# Patient Record
Sex: FEMALE | Race: BLACK OR AFRICAN AMERICAN | NOT HISPANIC OR LATINO | Employment: FULL TIME | URBAN - METROPOLITAN AREA
[De-identification: names, ages, dates, MRNs, and addresses within clinical notes are randomized per-mention and may not be internally consistent; named-entity substitution may affect disease eponyms.]

---

## 2017-01-03 ENCOUNTER — HOSPITAL ENCOUNTER (EMERGENCY)
Facility: HOSPITAL | Age: 32
Discharge: HOME | End: 2017-01-03
Attending: EMERGENCY MEDICINE
Payer: MEDICAID

## 2017-01-03 VITALS
DIASTOLIC BLOOD PRESSURE: 74 MMHG | HEIGHT: 65 IN | BODY MASS INDEX: 23.49 KG/M2 | OXYGEN SATURATION: 96 % | RESPIRATION RATE: 18 BRPM | SYSTOLIC BLOOD PRESSURE: 118 MMHG | HEART RATE: 79 BPM | WEIGHT: 141 LBS | TEMPERATURE: 98 F

## 2017-01-03 DIAGNOSIS — Z20.2 SEXUALLY TRANSMITTED DISEASE EXPOSURE: ICD-10-CM

## 2017-01-03 DIAGNOSIS — N93.9 ABNORMAL VAGINAL BLEEDING: Primary | ICD-10-CM

## 2017-01-03 LAB
B-HCG UR QL: NEGATIVE
BACTERIA #/AREA URNS HPF: ABNORMAL /HPF
BACTERIA GENITAL QL WET PREP: ABNORMAL
BILIRUB UR QL STRIP: NEGATIVE
CLARITY UR: CLEAR
CLUE CELLS VAG QL WET PREP: ABNORMAL
COLOR UR: YELLOW
CTP QC/QA: YES
FILAMENT FUNGI VAG WET PREP-#/AREA: ABNORMAL
GLUCOSE UR QL STRIP: NEGATIVE
HGB UR QL STRIP: ABNORMAL
KETONES UR QL STRIP: NEGATIVE
LEUKOCYTE ESTERASE UR QL STRIP: NEGATIVE
MICROSCOPIC COMMENT: ABNORMAL
NITRITE UR QL STRIP: NEGATIVE
PH UR STRIP: 6 [PH] (ref 5–8)
PROT UR QL STRIP: NEGATIVE
RBC #/AREA URNS HPF: 8 /HPF (ref 0–4)
SP GR UR STRIP: 1.02 (ref 1–1.03)
SPECIMEN SOURCE: ABNORMAL
SQUAMOUS #/AREA URNS HPF: 7 /HPF
T VAGINALIS GENITAL QL WET PREP: ABNORMAL
URN SPEC COLLECT METH UR: ABNORMAL
UROBILINOGEN UR STRIP-ACNC: NEGATIVE EU/DL
WBC #/AREA URNS HPF: 1 /HPF (ref 0–5)
WBC #/AREA VAG WET PREP: ABNORMAL
YEAST GENITAL QL WET PREP: ABNORMAL

## 2017-01-03 PROCEDURE — 87210 SMEAR WET MOUNT SALINE/INK: CPT

## 2017-01-03 PROCEDURE — 87591 N.GONORRHOEAE DNA AMP PROB: CPT

## 2017-01-03 PROCEDURE — 81025 URINE PREGNANCY TEST: CPT | Performed by: PHYSICIAN ASSISTANT

## 2017-01-03 PROCEDURE — 81000 URINALYSIS NONAUTO W/SCOPE: CPT

## 2017-01-03 PROCEDURE — 99284 EMERGENCY DEPT VISIT MOD MDM: CPT | Mod: 25

## 2017-01-03 NOTE — ED PROVIDER NOTES
"Encounter Date: 1/3/2017       History     Chief Complaint   Patient presents with    Exposure to STD     Pt. c/o itching and redness. Pt. stated, "I have a mirena and I also want to get it checked. I have been having spotting. My friend sent my a picture of this geovanni's penis and now I am worried".      Review of patient's allergies indicates:  No Known Allergies  HPI Comments: 30yo f with no significant pmh presents to ED with chief complaint suspected exposure to STI. Pt states she had unprotected sex on dillon and has been told that her partner has some sort of rash. She denies abdominal pain, denies urinary symptoms, denies flank pain, denies fever/chills, denies genital rash. Denies n/v/d. Denies hematuria. She does admit to spotting, which she states she hasn't done in over 2 years with Mirena device. She does not have PCP or OBGYN.     The history is provided by the patient.     History reviewed. No pertinent past medical history.  No past medical history pertinent negatives.  History reviewed. No pertinent past surgical history.  History reviewed. No pertinent family history.  Social History   Substance Use Topics    Smoking status: Never Smoker    Smokeless tobacco: None    Alcohol use No     Review of Systems   Constitutional: Negative.  Negative for chills and fever.   HENT: Negative.    Eyes: Negative.    Respiratory: Negative.  Negative for cough and shortness of breath.    Cardiovascular: Negative.  Negative for chest pain and leg swelling.   Gastrointestinal: Negative.  Negative for abdominal pain, anal bleeding, blood in stool, constipation, diarrhea, nausea and vomiting.   Endocrine: Negative.    Genitourinary: Positive for vaginal bleeding. Negative for difficulty urinating, dyspareunia, dysuria, flank pain, genital sores, hematuria, menstrual problem, pelvic pain, vaginal discharge and vaginal pain.   Musculoskeletal: Negative.  Negative for neck pain and neck stiffness.   Skin: Negative.  " Negative for color change, pallor, rash and wound.   Allergic/Immunologic: Negative.    Neurological: Negative.    Hematological: Negative.    Psychiatric/Behavioral: Negative.    All other systems reviewed and are negative.      Physical Exam   Initial Vitals   BP Pulse Resp Temp SpO2   01/03/17 1227 01/03/17 1227 01/03/17 1227 01/03/17 1227 01/03/17 1227   133/76 92 17 98.9 °F (37.2 °C) 98 %     Physical Exam    Nursing note and vitals reviewed.  Constitutional: Vital signs are normal. She appears well-developed and well-nourished. She does not have a sickly appearance. She does not appear ill. No distress.   HENT:   Head: Normocephalic and atraumatic.   Nose: Nose normal.   Eyes: Conjunctivae and EOM are normal.   Neck: Normal range of motion. Neck supple.   Cardiovascular: Normal heart sounds.   No murmur heard.  Pulmonary/Chest: Breath sounds normal. No respiratory distress. She has no wheezes. She exhibits no tenderness.   Abdominal: Soft. Normal appearance and bowel sounds are normal. She exhibits no distension. There is no tenderness. There is no rebound, no guarding, no CVA tenderness and negative Anaya's sign. No hernia.   Genitourinary: Rectum normal. Pelvic exam was performed with patient supine. There is no rash, tenderness or lesion on the right labia. There is no rash, tenderness or lesion on the left labia. Uterus is not deviated and not tender. Cervix exhibits no motion tenderness, no discharge and no friability. Right adnexum displays no mass and no tenderness. Left adnexum displays no mass and no tenderness. No erythema, tenderness or bleeding in the vagina. No foreign body in the vagina. No signs of injury around the vagina. Vaginal discharge found.       Genitourinary Comments: Cervical os closed. No discharge. No blood from os. No cmt. No cervix friability. 2 strings from OS visualized, in expected position. Brown,milky discharge in vaginal vault, no obvious bleeding, clots, laceration, or  erythema visualized in vagina    Musculoskeletal: Normal range of motion. She exhibits no tenderness.   Neurological: She is alert and oriented to person, place, and time. She has normal strength.   Skin: Skin is warm and dry. No rash and no abscess noted. No erythema.   Psychiatric: She has a normal mood and affect. Her behavior is normal. Judgment and thought content normal.         ED Course   Procedures  Labs Reviewed - No data to display          Medical Decision Making:   Initial Assessment:   32yo f presents with chief complaint possible exposure to STI and would like to be evaluated  Differential Diagnosis:   UTI, STI  Clinical Tests:   Lab Tests: Ordered and Reviewed  The following lab test(s) were unremarkable: Urinalysis  ED Management:  Well-appearing, in NAD. Vital signs WNL. No abdominal pain. No urinary symptoms. PE unremarkable. Suspicion of UTI low. Pyelonephritis likelihood low. She has no vaginal symptoms, no urinary symptoms, and PE unremarkable. I do not see benefit in treating her with abx in ED for g/c. Cultures will return and we will treat if positive at that point. She has no signs of hypovolemia or anemia. I do think she is safe for d/c with OBGYN f/u. She is to return if she starts with lightheadedness/dizziness, fever, abdominal pain. Vaginal screen negative. Urinalysis negative. Will call if G/C positive.   Other:   I have discussed this case with another health care provider.              Attending Attestation:     Physician Attestation Statement for NP/PA:   I discussed this assessment and plan of this patient with the NP/PA, but I did not personally examine the patient. The face to face encounter was performed by the NP/PA.    Other NP/PA Attestation Additions:      Medical Decision Makin-year-old female presenting after possible STD exposure.  Exam unremarkable.  I agree with plan.                 ED Course     Clinical Impression:   The primary encounter diagnosis was Abnormal  vaginal bleeding. A diagnosis of Sexually transmitted disease exposure was also pertinent to this visit.          Morgan Sarah PA-C  01/03/17 9424       Gunnar Glasgow MD  01/04/17 3839

## 2017-01-03 NOTE — ED TRIAGE NOTES
"Patient came in PER personal transport with c/o itching and redness. Patient patient states that she wants to get her mirena. Patient states that she and her friend "messed with geovanni" and now she needs to be checked because the geovanni has a rash.   "

## 2017-01-05 LAB
C TRACH DNA SPEC QL NAA+PROBE: NEGATIVE
N GONORRHOEA DNA SPEC QL NAA+PROBE: POSITIVE

## 2017-01-26 ENCOUNTER — HOSPITAL ENCOUNTER (EMERGENCY)
Facility: HOSPITAL | Age: 32
Discharge: HOME | End: 2017-01-26
Attending: EMERGENCY MEDICINE
Payer: MEDICAID

## 2017-01-26 VITALS
SYSTOLIC BLOOD PRESSURE: 117 MMHG | TEMPERATURE: 99 F | OXYGEN SATURATION: 99 % | RESPIRATION RATE: 18 BRPM | HEIGHT: 65 IN | HEART RATE: 94 BPM | BODY MASS INDEX: 23.66 KG/M2 | DIASTOLIC BLOOD PRESSURE: 77 MMHG | WEIGHT: 142 LBS

## 2017-01-26 DIAGNOSIS — A54.9 GONORRHEA: Primary | ICD-10-CM

## 2017-01-26 LAB
B-HCG UR QL: NEGATIVE
CTP QC/QA: YES

## 2017-01-26 PROCEDURE — 99283 EMERGENCY DEPT VISIT LOW MDM: CPT | Mod: 25

## 2017-01-26 PROCEDURE — 96372 THER/PROPH/DIAG INJ SC/IM: CPT

## 2017-01-26 PROCEDURE — 63600175 PHARM REV CODE 636 W HCPCS: Performed by: PHYSICIAN ASSISTANT

## 2017-01-26 PROCEDURE — 81025 URINE PREGNANCY TEST: CPT | Performed by: EMERGENCY MEDICINE

## 2017-01-26 RX ORDER — CEFTRIAXONE 250 MG/1
250 INJECTION, POWDER, FOR SOLUTION INTRAMUSCULAR; INTRAVENOUS
Status: COMPLETED | OUTPATIENT
Start: 2017-01-26 | End: 2017-01-26

## 2017-01-26 RX ADMIN — CEFTRIAXONE SODIUM 250 MG: 250 INJECTION, POWDER, FOR SOLUTION INTRAMUSCULAR; INTRAVENOUS at 12:01

## 2017-01-26 NOTE — ED AVS SNAPSHOT
[facility]  [address] [place] [address]               Krista Robledo   2017 12:20 PM   ED    Description:  Female : 1985   Department:  [facility]           Your Care was Coordinated By:     Provider Role From To    Rahel Gann MD Attending Provider 17 1220 --    ERLINDA Sosa Physician Assistant 17 1220 --      Reason for Visit     Abnormal Lab           Diagnoses this Visit        Comments    Gonorrhea    -  Primary       ED Disposition     None           To Do List           Follow-up Information     Follow up with Sam Barlow MD.    Specialty:  Obstetrics    Why:  Follow up with your gynecologist or Dr. Barlow in 14 days for test of cure.  Call to schedule an appointment.    Contact information:    [address]  [place] [address]  [phone]        [facility]     [facility] Nurse Care [facility] -  Assistance  Registered nurses in the [facility] provide clinical advisement, health education, appointment booking, and other advisory services.  Call for this free service at [phone].             Medications           Message regarding Medications     Verify the changes and/or additions to your medication regime listed below are the same as discussed with your clinician today.  If any of these changes or additions are incorrect, please notify your healthcare provider.        These medications were administered today        Dose Freq    cefTRIAXone injection 250 mg 250 mg ED 1 Time    Sig: Inject 250 mg into the muscle ED 1 Time.    Class: Normal    Route: Intramuscular           Verify that the below list of medications is an accurate representation of the medications you are currently taking.  If none reported, the list may be blank. If incorrect, please contact your healthcare provider. Carry this list with you in case of emergency.           Current Medications      "hydrocortisone 2.5 % lotion Apply topically 2 (two) times daily.           Clinical Reference Information           Your Vitals Were     BP Pulse Temp Resp Height Weight    117/77 94 98.8 °F (37.1 °C) 18 5' 5" (1.651 m) 64.4 kg (142 lb)    Last Period SpO2 BMI          (LMP Unknown) 99% 23.63 kg/m2        Allergies as of 1/26/2017     No Known Allergies      Immunizations Administered on Date of Encounter - 1/26/2017     None      ED Micro, Lab, POCT     Start Ordered       Status Ordering Provider    01/26/17 1156 01/26/17 1155  POCT urine pregnancy  Once      Final result       ED Imaging Orders     None        Discharge Instructions       The patient is discharged to home.  You are to follow up as directed above.  No sex until your partner is treated and you both have a test of cure 14 days from day of treatment.  Return to the ED for any new or worsening symptoms: fever, abdominal/pelvic pain, unable to tolerate oral intake, weakness, dizziness, or any other concerns.        Gonorrhea (Female, Treated)    You have an infection called gonorrhea. This infection is a sexually transmitted disease (STD). It is highly contagious. It is passed by sexual contact with an infected partner. Gonorrhea can infect the internal sex organs (cervix, uterus, or fallopian tubes). Less often, it can infect the mouth, throat, and anus. In rare instances, it can infect the joints, eyes, and other areas of the body.  Women with infections in the cervix may have no symptoms or only mild symptoms early in the illness. Therefore, it is possible to pass on this infection without knowing you have it.  When symptoms do occur in a woman, they usually start 2 to 10 days after exposure. There may be a thick vaginal discharge and pain or burning when urinating. If the infection spreads to the fallopian tubes, it is called pelvic inflammatory disease (PID). This causes lower abdominal pain and fever. If not treated, gonorrhea can cause " infertility (being unable to have children) by scarring the fallopian tubes. PID also increases the risk of having a pregnancy outside the uterus (ectopic pregnancy) in the future.  Gonorrhea can be treated and cured. Treatment is with antibiotic medicine.  Home care  · Any sexual partner you have had within the last 2 months needs to be treated, even if there are no symptoms. Your partner should contact his or her healthcare provider. Or he or she can go to an urgent care clinic or the public health department to be examined and treated.  · Avoid sexual activity until both you and your partner have completed all antibiotic medicine and you have been told by your healthcare provider that you are no longer contagious.  · It is important to take all medicine as directed until it is finished. If not, the illness may recur.  · Learn about safer sex practices and use these in the future. The safest sex is with a partner who has tested negative and only has sex with you. Latex barriers such as condoms offer protection from spreading some sexually transmitted diseases, including gonorrhea, chlamydia, and HIV, but they are not a guarantee.  Follow-up care  Follow up with your healthcare provider, or as advised.  If tests were done, call as directed for results. A follow-up test should be done 4 to 6 weeks after treatment, to be sure the infection has cleared. Follow up with your healthcare provider or the public health department for complete STD screening, including HIV testing. For more information about STDs, go to www.cdc.gov/std or call CDC-Info: 411.726.6403.  When to seek medical advice  Call your healthcare provider right away if any of these occur:  · No improvement after 3 days of treatment  · New or increasing lower abdominal pain or back pain  · Unexpected vaginal bleeding  · Weakness, dizziness, or fainting  · Repeated vomiting  · Inability to urinate due to pain  · Rash or joint pain  · Painful sores on the  outer vaginal area  · Enlarged, painful lymph nodes (lumps) in the groin  · Fever of 100.4ºF (38ºC) or higher  © 8363-4211 [employer]. [address] [place] [address], [place] [address]. All rights reserved. This information is not intended as a substitute for professional medical care. Always follow your healthcare professional's instructions.          [facility] Sign-Up     Activating your [facility] account is as easy as 1-2-3!     1) Visit [email or web address], select Sign Up Now, enter this activation code and your date of birth, then select Next.  [email or web address]  Expires: 2/17/2017  2:28 PM      2) Create a username and password to use when you visit [facility] in the future and select a security question in case you lose your password and select Next.    3) Enter your e-mail address and click Sign Up!    Additional Information  If you have questions, please e-mail [email or web address] or call [phone] to talk to our [facility] staff. Remember, [facility] is NOT to be used for urgent needs. For medical emergencies, dial 911.          [facility] complies with applicable Federal civil rights laws and does not discriminate on the basis of race, color, national origin, age, disability, or sex.        Language Assistance Services     ATTENTION: Language assistance services are available, free of charge. Please call [phone].      ATENCIÓN: Si habla español, tiene a zarate disposición servicios gratuitos de asistencia lingüística. Llame al [phone].     CHÚ Ý: N?u b?n nói Ti?ng Vi?t, có các d?ch v? h? tr? ngôn ng? mi?n phí dành cho b?n. G?i s? [phone].

## 2017-01-26 NOTE — ED PROVIDER NOTES
"Encounter Date: 1/26/2017       History     Chief Complaint   Patient presents with    Abnormal Lab     pt states " I received a letter in the mail to come to the ED."     Review of patient's allergies indicates:  No Known Allergies  HPI Comments: Historian: Patient  Chief complaint: "I received a certified letter from the emergency department."  History of present illness: This 31-year-old female presents to the emergency department stating she received a certified letter from the emergency department regarding results.  Patient was seen and evaluated in this ER on 1/3/17.  She had a culture for GC/C obtained at that time.  This was positive for gonorrhea.  The patient denies fever, abdominal pain, pelvic pain, nausea, vomiting, diarrhea, vaginal discharge, vaginal lesions, dysuria, hematuria, and increased urinary frequency.    History reviewed. No pertinent past medical history.  Past Medical History Pertinent Negatives   Diagnosis Date Noted    Asthma 1/26/2017    Diabetes mellitus 1/26/2017    Hypertension 1/26/2017    Seizures 1/26/2017     History reviewed. No pertinent past surgical history.  History reviewed. No pertinent family history.  Social History   Substance Use Topics    Smoking status: Never Smoker    Smokeless tobacco: None    Alcohol use Yes      Comment: occasionally     Review of Systems   Constitutional: Negative for fever.   HENT: Negative for trouble swallowing.    Gastrointestinal: Negative for abdominal pain, diarrhea, nausea and vomiting.   Genitourinary: Negative for dysuria, frequency, hematuria, pelvic pain and vaginal discharge.   Musculoskeletal: Negative for gait problem.   Skin: Negative for rash.   Allergic/Immunologic: Negative for immunocompromised state.   Neurological: Negative for seizures.   Psychiatric/Behavioral: Negative for confusion.       Physical Exam   Initial Vitals   BP Pulse Resp Temp SpO2   01/26/17 1108 01/26/17 1108 01/26/17 1108 01/26/17 1108 01/26/17 " 1108   117/77 94 18 98.8 °F (37.1 °C) 99 %     Physical Exam    Constitutional: She appears well-developed and well-nourished. She is cooperative.  Non-toxic appearance. No distress.   HENT:   Head: Normocephalic and atraumatic.   Mouth/Throat: Mucous membranes are normal. No trismus in the jaw.   Cardiovascular: Normal rate, regular rhythm and normal heart sounds. Exam reveals no gallop.    Pulmonary/Chest: Effort normal and breath sounds normal. No tachypnea. No respiratory distress. She has no decreased breath sounds. She has no wheezes. She has no rhonchi. She has no rales.   Abdominal: Soft. Normal appearance and bowel sounds are normal. There is no tenderness. There is no rigidity, no rebound, no guarding, no CVA tenderness, no tenderness at McBurney's point and negative Anaya's sign.   Neurological: She is alert and oriented to person, place, and time. She has normal strength. No sensory deficit.   Skin: Skin is warm, dry and intact. No rash noted.         ED Course   Procedures  Labs Reviewed   POCT URINE PREGNANCY                Additional MDM:   Comments: Patient presents after receiving a certified letter from the emergency department.  She is afebrile and nontoxic appearing with a supple neck and no meningismus.  Patient was seen on 1/3/17, and she had a culture for GC/C obtained at that time.  This was positive, however she was not able to be reached by her phone number to discuss these results.  The patient denies fever, abdominal pain, pelvic pain, vomiting, and vaginal discharge.  Her abdomen is soft and nontender and without peritoneal signs on my exam.  Urine pregnancy test is negative.  Patient was given intramuscular Rocephin in the emergency department.  She understands to have a test of cure in 14 days.  She understands to abstain from sex until all partners are treated and they have tests of cure.  Careful ED warnings and return instructions given.  This patient's case was discussed with   Marcy, she is in agreement with the assessment and plan..            Attending Attestation:     Physician Attestation Statement for NP/PA:   I discussed this assessment and plan of this patient with the NP/PA, but I did not personally examine the patient. The face to face encounter was performed by the NP/PA.    Other NP/PA Attestation Additions:      Medical Decision Makin y.o. Female presents after confirmed positive GC culture, received certified letter. Given IM rocephin and will be referred to PCP for test of cure. I have discussed this patient with mid-level provider and agree with physical exam, assessment and plan.                   ED Course     Clinical Impression:   The encounter diagnosis was Gonorrhea.          ERLINDA Sosa  17 1910       Rahel Gann MD  17 1711

## 2017-01-26 NOTE — DISCHARGE INSTRUCTIONS
The patient is discharged to home.  You are to follow up as directed above.  No sex until your partner is treated and you both have a test of cure 14 days from day of treatment.  Return to the ED for any new or worsening symptoms: fever, abdominal/pelvic pain, unable to tolerate oral intake, weakness, dizziness, or any other concerns.        Gonorrhea (Female, Treated)    You have an infection called gonorrhea. This infection is a sexually transmitted disease (STD). It is highly contagious. It is passed by sexual contact with an infected partner. Gonorrhea can infect the internal sex organs (cervix, uterus, or fallopian tubes). Less often, it can infect the mouth, throat, and anus. In rare instances, it can infect the joints, eyes, and other areas of the body.  Women with infections in the cervix may have no symptoms or only mild symptoms early in the illness. Therefore, it is possible to pass on this infection without knowing you have it.  When symptoms do occur in a woman, they usually start 2 to 10 days after exposure. There may be a thick vaginal discharge and pain or burning when urinating. If the infection spreads to the fallopian tubes, it is called pelvic inflammatory disease (PID). This causes lower abdominal pain and fever. If not treated, gonorrhea can cause infertility (being unable to have children) by scarring the fallopian tubes. PID also increases the risk of having a pregnancy outside the uterus (ectopic pregnancy) in the future.  Gonorrhea can be treated and cured. Treatment is with antibiotic medicine.  Home care  · Any sexual partner you have had within the last 2 months needs to be treated, even if there are no symptoms. Your partner should contact his or her healthcare provider. Or he or she can go to an urgent care clinic or the public health department to be examined and treated.  · Avoid sexual activity until both you and your partner have completed all antibiotic medicine and you have been  told by your healthcare provider that you are no longer contagious.  · It is important to take all medicine as directed until it is finished. If not, the illness may recur.  · Learn about safer sex practices and use these in the future. The safest sex is with a partner who has tested negative and only has sex with you. Latex barriers such as condoms offer protection from spreading some sexually transmitted diseases, including gonorrhea, chlamydia, and HIV, but they are not a guarantee.  Follow-up care  Follow up with your healthcare provider, or as advised.  If tests were done, call as directed for results. A follow-up test should be done 4 to 6 weeks after treatment, to be sure the infection has cleared. Follow up with your healthcare provider or the public health department for complete STD screening, including HIV testing. For more information about STDs, go to www.cdc.gov/std or call CDC-Info: 483.826.7033.  When to seek medical advice  Call your healthcare provider right away if any of these occur:  · No improvement after 3 days of treatment  · New or increasing lower abdominal pain or back pain  · Unexpected vaginal bleeding  · Weakness, dizziness, or fainting  · Repeated vomiting  · Inability to urinate due to pain  · Rash or joint pain  · Painful sores on the outer vaginal area  · Enlarged, painful lymph nodes (lumps) in the groin  · Fever of 100.4ºF (38ºC) or higher  © 1470-4880 [employer]. [address] [place] [address]. All rights reserved. This information is not intended as a substitute for professional medical care. Always follow your healthcare professional's instructions.

## 2017-04-03 ENCOUNTER — HOSPITAL ENCOUNTER (EMERGENCY)
Facility: HOSPITAL | Age: 32
Discharge: HOME | End: 2017-04-03
Attending: EMERGENCY MEDICINE
Payer: MEDICAID

## 2017-04-03 VITALS
SYSTOLIC BLOOD PRESSURE: 114 MMHG | DIASTOLIC BLOOD PRESSURE: 62 MMHG | HEIGHT: 64 IN | WEIGHT: 153 LBS | TEMPERATURE: 99 F | RESPIRATION RATE: 16 BRPM | BODY MASS INDEX: 26.12 KG/M2 | HEART RATE: 82 BPM | OXYGEN SATURATION: 98 %

## 2017-04-03 DIAGNOSIS — J02.0 STREP THROAT: Primary | ICD-10-CM

## 2017-04-03 LAB
B-HCG UR QL: NEGATIVE
CTP QC/QA: YES

## 2017-04-03 PROCEDURE — 96372 THER/PROPH/DIAG INJ SC/IM: CPT

## 2017-04-03 PROCEDURE — 63600175 PHARM REV CODE 636 W HCPCS: Performed by: EMERGENCY MEDICINE

## 2017-04-03 PROCEDURE — 81025 URINE PREGNANCY TEST: CPT | Performed by: EMERGENCY MEDICINE

## 2017-04-03 PROCEDURE — 99283 EMERGENCY DEPT VISIT LOW MDM: CPT | Mod: 25

## 2017-04-03 RX ORDER — DEXAMETHASONE SODIUM PHOSPHATE 4 MG/ML
10 INJECTION, SOLUTION INTRA-ARTICULAR; INTRALESIONAL; INTRAMUSCULAR; INTRAVENOUS; SOFT TISSUE
Status: COMPLETED | OUTPATIENT
Start: 2017-04-03 | End: 2017-04-03

## 2017-04-03 RX ADMIN — PENICILLIN G BENZATHINE 1.2 MILLION UNITS: 1200000 INJECTION, SUSPENSION INTRAMUSCULAR at 08:04

## 2017-04-03 RX ADMIN — DEXAMETHASONE SODIUM PHOSPHATE 10 MG: 4 INJECTION, SOLUTION INTRAMUSCULAR; INTRAVENOUS at 08:04

## 2017-04-03 NOTE — ED AVS SNAPSHOT
[facility]  [place] [address]               [facility]   4/3/2017  8:00 PM   ED    Description:  Female : 1985   Department:  [facility]           Your Care was Coordinated By:     Provider Role From To    Jorge Olivo MD Attending Provider 17 --      Reason for Visit     Sore Throat           Diagnoses this Visit        Comments    Strep throat    -  Primary       ED Disposition     ED Disposition Condition Comment    Discharge  Our goal in the emergency department is to always give you outstanding care and exceptional service. You may receive a survey by mail or e-mail in the next week regarding your experience in our ED. We would greatly appreciate your completing and returnin g the survey. Your feedback provides us with a way to recognize our staff who give very good care and it helps us learn how to improve when your experience was below our aspiration of excellence.              To Do List           Follow-up Information     Follow up with [facility].    Specialty:  Emergency Medicine    Why:  If symptoms worsen    Contact information:    [place] [id number] [phone]        Please follow up.    Why:  FOLLOW UP WITH YOUR PRIMARY CARE PHYSICIAN, For repeat evaluation      [facility]     [facility] Care [facility] - 24 Assistance  Unless otherwise directed by your provider, please contact [facility], our nurse care line that is available for  assistance.     Registered nurses in the [facility] provide: appointment scheduling, clinical advisement, health education, and other advisory services.  Call: [phone] (toll free)               Medications           Message regarding Medications     Verify the changes and/or additions to your medication regime listed below are the same as discussed with your clinician today.  If any of  "these changes or additions are incorrect, please notify your healthcare provider.        These medications were administered today        Dose Freq    dexamethasone injection 10 mg 10 mg ED 1 Time    Sig: Inject 2.5 mLs (10 mg total) into the muscle ED 1 Time.    Class: Normal    Route: Intramuscular    penicillin G benzathine (BICILLIN LA) injection 1.2 Million Units 1.2 Million Units ED 1 Time    Sig: Inject 2 mLs (1.2 Million Units total) into the muscle ED 1 Time.    Class: Normal    Route: Intramuscular           Verify that the below list of medications is an accurate representation of the medications you are currently taking.  If none reported, the list may be blank. If incorrect, please contact your healthcare provider. Carry this list with you in case of emergency.           Current Medications     dexamethasone injection 10 mg Inject 2.5 mLs (10 mg total) into the muscle ED 1 Time.    hydrocortisone 2.5 % lotion Apply topically 2 (two) times daily.    penicillin G benzathine (BICILLIN LA) injection 1.2 Million Units Inject 2 mLs (1.2 Million Units total) into the muscle ED 1 Time.           Clinical Reference Information           Your Vitals Were     BP Pulse Temp Resp Height Weight    114/62 (BP Location: Left arm, Patient Position: Sitting) 82 98.7 °F (37.1 °C) (Oral) 16 5' 4" (1.626 m) 69.4 kg (153 lb)    SpO2 BMI             96% 26.26 kg/m2         Allergies as of 4/3/2017     No Known Allergies      Immunizations Administered on Date of Encounter - 4/3/2017     None      ED Micro, Lab, POCT     Start Ordered       Status Ordering Provider    04/03/17 1939 04/03/17 1939  POCT urine pregnancy  Once      Final result       ED Imaging Orders     None        Discharge Instructions         Pharyngitis: Strep (Presumed)    You have pharyngitis (sore throat). The cause is thought to be the streptococcus, or strep, bacterium. Strep throat infection can cause throat pain that is worse when swallowing, aching " all over, headache, and fever. The infection may be spread by coughing, kissing, or touching others after touching your mouth or nose. Antibiotic medications are given to treat the infection.  Home care  · Rest at home. Drink plenty of fluids to avoid dehydration.  · No work or school for the first 2 days of taking the antibiotics. After this time, you will not be contagious. You can then return to work or school if you are feeling better.   · The antibiotic medication must be taken for the full 10 days, even if you feel better. This is very important to ensure the infection is treated. It is also important to prevent drug-resistant organisms from developing. If you were given an antibiotic shot, no more antibiotics are needed.  · You may use acetaminophen or ibuprofen to control pain or fever, unless another medicine was prescribed for this. If you have chronic liver or kidney disease or ever had a stomach ulcer or GI bleeding, talk with your doctor before using these medicines.  · Throat lozenges or a throat-numbing sprays can help reduce throat pain. Gargling with warm salt water can also help. Dissolve 1/2 teaspoon of salt in 1 8 ounce glass of warm water.   · Avoid salty or spicy foods, which can irritate the throat.  Follow-up care  Follow up with your healthcare provider or our staff if you are not improving over the next week.  When to seek medical advice  Call your healthcare provider right away if any of these occur:  · Fever as directed by your doctor.   · New or worsening ear pain, sinus pain, or headache  · Painful lumps in the back of neck  · Stiff neck  · Lymph nodes are getting larger  · Inability to swallow liquids, excessive drooling, or inability to open mouth wide due to throat pain  · Signs of dehydration (very dark urine or no urine, sunken eyes, dizziness)  · Trouble breathing or noisy breathing  · Muffled voice  · New rash  Date Last Reviewed: 4/13/2015  © 1109-2820 [employer]  [address] [place] [address]. All rights reserved. This information is not intended as a substitute for professional medical care. Always follow your healthcare professional's instructions.          [facility] Sign-Up     Activating your [facility] account is as easy as 1-2-3!     1) Visit [email or web address], select Sign Up Now, enter this activation code and your date of birth, then select Next.  [email or web address]  Expires: 5/18/2017  8:08 PM      2) Create a username and password to use when you visit [facility] in the future and select a security question in case you lose your password and select Next.    3) Enter your e-mail address and click Sign Up!    Additional Information  If you have questions, please e-mail [email or web address] or call [phone] to talk to our [facility] staff. Remember, [facility] is NOT to be used for urgent needs. For medical emergencies, dial 911.          [facility] complies with applicable Federal civil rights laws and does not discriminate on the basis of race, color, national origin, age, disability, or sex.        Language Assistance Services     ATTENTION: Language assistance services are available, free of charge. Please call [phone].      ATENCIÓN: Si habla español, tiene a zarate disposición servicios gratuitos de asistencia lingüística. Llame al [phone].     CHÚ Ý: N?u b?n nói Ti?ng Vi?t, có các d?ch v? h? tr? ngôn ng? mi?n phí dành cho b?n. G?i s? [phone].

## 2017-04-04 NOTE — DISCHARGE INSTRUCTIONS
Pharyngitis: Strep (Presumed)    You have pharyngitis (sore throat). The cause is thought to be the streptococcus, or strep, bacterium. Strep throat infection can cause throat pain that is worse when swallowing, aching all over, headache, and fever. The infection may be spread by coughing, kissing, or touching others after touching your mouth or nose. Antibiotic medications are given to treat the infection.  Home care  · Rest at home. Drink plenty of fluids to avoid dehydration.  · No work or school for the first 2 days of taking the antibiotics. After this time, you will not be contagious. You can then return to work or school if you are feeling better.   · The antibiotic medication must be taken for the full 10 days, even if you feel better. This is very important to ensure the infection is treated. It is also important to prevent drug-resistant organisms from developing. If you were given an antibiotic shot, no more antibiotics are needed.  · You may use acetaminophen or ibuprofen to control pain or fever, unless another medicine was prescribed for this. If you have chronic liver or kidney disease or ever had a stomach ulcer or GI bleeding, talk with your doctor before using these medicines.  · Throat lozenges or a throat-numbing sprays can help reduce throat pain. Gargling with warm salt water can also help. Dissolve 1/2 teaspoon of salt in 1 8 ounce glass of warm water.   · Avoid salty or spicy foods, which can irritate the throat.  Follow-up care  Follow up with your healthcare provider or our staff if you are not improving over the next week.  When to seek medical advice  Call your healthcare provider right away if any of these occur:  · Fever as directed by your doctor.   · New or worsening ear pain, sinus pain, or headache  · Painful lumps in the back of neck  · Stiff neck  · Lymph nodes are getting larger  · Inability to swallow liquids, excessive drooling, or inability to open mouth wide due to throat  pain  · Signs of dehydration (very dark urine or no urine, sunken eyes, dizziness)  · Trouble breathing or noisy breathing  · Muffled voice  · New rash  Date Last Reviewed: 4/13/2015  © 9769-5516 [employer]. [address] [place] [address], [place] [address]. All rights reserved. This information is not intended as a substitute for professional medical care. Always follow your healthcare professional's instructions.

## 2017-11-22 ENCOUNTER — HOSPITAL ENCOUNTER (EMERGENCY)
Facility: HOSPITAL | Age: 32
Discharge: HOME | End: 2017-11-22
Attending: EMERGENCY MEDICINE
Payer: MEDICAID

## 2017-11-22 VITALS
WEIGHT: 145 LBS | TEMPERATURE: 98 F | HEART RATE: 94 BPM | HEIGHT: 64 IN | OXYGEN SATURATION: 98 % | RESPIRATION RATE: 20 BRPM | DIASTOLIC BLOOD PRESSURE: 58 MMHG | SYSTOLIC BLOOD PRESSURE: 111 MMHG | BODY MASS INDEX: 24.75 KG/M2

## 2017-11-22 DIAGNOSIS — S39.012A LUMBAR STRAIN, INITIAL ENCOUNTER: Primary | ICD-10-CM

## 2017-11-22 PROCEDURE — 25000003 PHARM REV CODE 250: Performed by: PHYSICIAN ASSISTANT

## 2017-11-22 PROCEDURE — 99284 EMERGENCY DEPT VISIT MOD MDM: CPT

## 2017-11-22 RX ORDER — LIDOCAINE 50 MG/G
1 PATCH TOPICAL DAILY
Qty: 8 PATCH | Refills: 0 | Status: SHIPPED | OUTPATIENT
Start: 2017-11-22

## 2017-11-22 RX ORDER — METHOCARBAMOL 500 MG/1
1000 TABLET, FILM COATED ORAL 3 TIMES DAILY
Qty: 15 TABLET | Refills: 0 | Status: SHIPPED | OUTPATIENT
Start: 2017-11-22 | End: 2017-11-27

## 2017-11-22 RX ORDER — DEXTROMETHORPHAN HYDROBROMIDE, GUAIFENESIN 5; 100 MG/5ML; MG/5ML
650 LIQUID ORAL EVERY 8 HOURS
Refills: 0 | COMMUNITY
Start: 2017-11-22

## 2017-11-22 RX ORDER — ACETAMINOPHEN 500 MG
1000 TABLET ORAL
Status: COMPLETED | OUTPATIENT
Start: 2017-11-22 | End: 2017-11-22

## 2017-11-22 RX ADMIN — ACETAMINOPHEN 1000 MG: 500 TABLET ORAL at 05:11

## 2017-11-22 NOTE — ED TRIAGE NOTES
Back Pain: Patient presents for presents evaluation of low back problems.  Symptoms have been present for 2 hours and include pain in lower back  (boring, sharp, shooting, stabbing and tight band in character; 10/10 in severity) and stiffness in lower back. Initial inciting event: none.   Exacerbating factors identifiable by patient are bending backwards, bending forwards, bending sideways and standing.

## 2017-11-22 NOTE — ED PROVIDER NOTES
"Encounter Date: 11/22/2017    SCRIBE #1 NOTE: I, Janet Ramirez, am scribing for, and in the presence of,  Jamey De La Vega PA-C. I have scribed the following portions of the note - Other sections scribed: HPI, ROS.       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 Pain is 7/10. Pain began while the pt was at work. 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 Pain is described as a burning sensation. No recent fall, trauma, or MVC. Pt otherwise denies fever, numbness, weakness, abdominal pain, N/V/D, vaginal discharge, dysuria, and any other associated symptoms. No prior attempted treatment. No alleviating factors. Pt reports she has the mirena IUD and does not get periods.      The history is provided by the patient. No  was used.     Review of patient's allergies indicates:  No Known Allergies  History reviewed. No pertinent past medical history.  History reviewed. No pertinent surgical history.  History reviewed. No pertinent family history.  Social History   Substance Use Topics    Smoking status: Never Smoker    Smokeless tobacco: Never Used    Alcohol use Yes      Comment: occasionally     Review of Systems   Constitutional: Negative for chills, diaphoresis and fever.   HENT: Negative for ear pain and sore throat.    Eyes: Negative for photophobia and visual disturbance.   Respiratory: Negative for cough and shortness of breath.    Cardiovascular: Negative for chest pain.   Gastrointestinal: Negative for abdominal pain, diarrhea, nausea and vomiting.   Genitourinary: Negative for dysuria and vaginal discharge.   Musculoskeletal: Positive for back pain.   Skin: Negative for rash. "   Neurological: Negative for weakness, numbness and headaches.       Physical Exam     Initial Vitals [11/22/17 0427]   BP Pulse Resp Temp SpO2   (!) 122/57 (!) 116 18 97.4 °F (36.3 °C) 100 %      MAP       78.67         Physical Exam    Vitals reviewed.  Constitutional: She appears well-developed and well-nourished. She is not diaphoretic. No distress.   HENT:   Head: Normocephalic and atraumatic.   Right Ear: External ear normal.   Left Ear: External ear normal.   Nose: Nose normal.   Eyes: Conjunctivae are normal. No scleral icterus.   Neck: Normal range of motion. Neck supple.   Cardiovascular: Normal rate, regular rhythm, normal heart sounds and intact distal pulses.   DP pulses strong and equal bilaterally.   Pulmonary/Chest: Breath sounds normal. No respiratory distress. She has no wheezes. She has no rhonchi. She has no rales. She exhibits no tenderness.   Abdominal: Soft. Bowel sounds are normal. She exhibits no distension and no mass. There is no tenderness. There is no rebound and no guarding.   Musculoskeletal: Normal range of motion. She exhibits tenderness.   Mild tenderness to bilateral lumbar paraspinal region.  No bony vertebral tenderness.   Neurological: She is alert and oriented to person, place, and time. She has normal strength. No sensory deficit.   Skin: Skin is warm and dry. No rash noted.         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  She first noticed the pain when standing, and the pains exacerbated by any movement of the trunk and lower back.  She denies abdominal pain, dysuria, vaginal discharge, lower extremity weakness or numbness, fever.  Differential Diagnosis:   Lumbar muscle strain/spasm, and less likely malignancy, cauda equina syndrome, spinal epidural abscess, ectopic pregnancy,  pyelonephritis, urolithiasis  ED Management:  Patient presents in no distress.  She has mild lumbar paraspinal tenderness without midline bony tenderness.  Lower extremities are neurovascularly intact.  Abdomen soft and nontender.  I suspect lumbar muscle strain and will treat with Tylenol and muscle relaxer, as well as lidocaine patches.  I do not suspect infection or obstructive uropathy.  Patient given return precautions and advised to follow-up with PCP.  She verbalized understanding and agreed with plan.  Case discussed with Dr. Naranjo.            Scribe Attestation:   Scribe #1: I performed the above scribed service and the documentation accurately describes the services I performed. I attest to the accuracy of the note.    Attending Attestation:           Physician Attestation for Scribe:  Physician Attestation Statement for Scribe #1: I, Jamey De La Vega PA-C, reviewed documentation, as scribed by Janet Ramirez in my presence, and it is both accurate and complete.                 ED Course      Clinical Impression:   The encounter diagnosis was Lumbar strain, initial encounter.                           Jamey De La Vega PA-C  11/22/17 0551

## 2018-11-02 NOTE — ED PROVIDER NOTES
Encounter Date: 4/3/2017       History     Chief Complaint   Patient presents with    Sore Throat     Pt states her throat burn when she breathes , her ears burn, and her glands are swollen     Review of patient's allergies indicates:  No Known Allergies  Patient is a 32 y.o. female presenting with the following complaint: sore throat. The history is provided by the patient.   Sore Throat   This is a new problem. The current episode started more than 2 days ago. The problem occurs constantly. The problem has not changed since onset.Pertinent negatives include no chest pain, no abdominal pain, no headaches and no shortness of breath. The symptoms are aggravated by swallowing and eating. Nothing relieves the symptoms. She has tried nothing for the symptoms.     History reviewed. No pertinent past medical history.  History reviewed. No pertinent surgical history.  History reviewed. No pertinent family history.  Social History   Substance Use Topics    Smoking status: Never Smoker    Smokeless tobacco: None    Alcohol use Yes      Comment: occasionally     Review of Systems   Constitutional: Negative for fever.   HENT: Positive for sore throat.    Respiratory: Negative for shortness of breath.    Cardiovascular: Negative for chest pain.   Gastrointestinal: Negative for abdominal pain and nausea.   Genitourinary: Negative for dysuria.   Musculoskeletal: Negative for back pain.   Skin: Negative for rash.   Neurological: Negative for weakness and headaches.   Hematological: Does not bruise/bleed easily.       Physical Exam   Initial Vitals   BP Pulse Resp Temp SpO2   04/03/17 1937 04/03/17 1937 04/03/17 1937 04/03/17 1937 04/03/17 1937   114/62 82 16 98.7 °F (37.1 °C) 96 %     Physical Exam    Nursing note and vitals reviewed.  Constitutional: She appears well-developed and well-nourished.   HENT:   Head: Normocephalic and atraumatic.   Nose: Nose normal.   Mouth/Throat: Uvula is midline and mucous membranes are  normal. Oropharyngeal exudate present.   Eyes: EOM and lids are normal.   Neck: Neck supple.   Pulmonary/Chest: No respiratory distress.   Abdominal: Normal appearance.   Musculoskeletal: Normal range of motion.   Lymphadenopathy:     She has cervical adenopathy.   Neurological: She is alert.   Skin: Skin is warm and dry.   Psychiatric: She has a normal mood and affect. Her behavior is normal. Thought content normal.         ED Course   Procedures  Labs Reviewed   POCT URINE PREGNANCY             Medical Decision Making:   History:   Old Medical Records: I decided to obtain old medical records.  ED Management:  Sore throat    Exam reveals exudate, cervical adenopathy, and no cough.      She has strep throat.  No evidence of PTA or epiglottitis.    Will treat for strep with bicillin and decadron.                   ED Course     Clinical Impression:   The encounter diagnosis was Strep throat.    Disposition:   Disposition: Discharged  Condition: Stable       Jorge Olivo MD  04/03/17 2016     Patient with abdominal pain R>L associated with nausea and no other GI symptoms noted. Last meal @ 9pm.

## 2020-01-19 ENCOUNTER — HOSPITAL ENCOUNTER (EMERGENCY)
Facility: HOSPITAL | Age: 35
Discharge: HOME | End: 2020-01-19
Attending: EMERGENCY MEDICINE

## 2020-01-19 VITALS
RESPIRATION RATE: 18 BRPM | SYSTOLIC BLOOD PRESSURE: 103 MMHG | WEIGHT: 131 LBS | TEMPERATURE: 99 F | HEIGHT: 64 IN | HEART RATE: 94 BPM | DIASTOLIC BLOOD PRESSURE: 59 MMHG | OXYGEN SATURATION: 96 % | BODY MASS INDEX: 22.36 KG/M2

## 2020-01-19 DIAGNOSIS — L30.9 DERMATITIS: Primary | ICD-10-CM

## 2020-01-19 DIAGNOSIS — R21 RASH: ICD-10-CM

## 2020-01-19 LAB
B-HCG UR QL: NEGATIVE
CTP QC/QA: YES

## 2020-01-19 PROCEDURE — 63600175 PHARM REV CODE 636 W HCPCS: Performed by: PHYSICIAN ASSISTANT

## 2020-01-19 PROCEDURE — 99284 EMERGENCY DEPT VISIT MOD MDM: CPT | Mod: 25

## 2020-01-19 PROCEDURE — 81025 URINE PREGNANCY TEST: CPT | Performed by: PHYSICIAN ASSISTANT

## 2020-01-19 RX ORDER — PREDNISONE 20 MG/1
40 TABLET ORAL DAILY
Qty: 8 TABLET | Refills: 0 | Status: SHIPPED | OUTPATIENT
Start: 2020-01-19 | End: 2020-01-23

## 2020-01-19 RX ORDER — PREDNISONE 20 MG/1
40 TABLET ORAL
Status: COMPLETED | OUTPATIENT
Start: 2020-01-19 | End: 2020-01-19

## 2020-01-19 RX ORDER — HYDROXYZINE PAMOATE 25 MG/1
25 CAPSULE ORAL EVERY 6 HOURS PRN
Qty: 16 CAPSULE | Refills: 0 | Status: SHIPPED | OUTPATIENT
Start: 2020-01-19

## 2020-01-19 RX ADMIN — PREDNISONE 40 MG: 20 TABLET ORAL at 02:01

## 2020-01-19 NOTE — DISCHARGE INSTRUCTIONS
Please take new medication as directed and follow discharge instructions provided. Please make sure to follow up with PCP and dermatologist using resources provided to discuss today's Emergency Department visit and for further evaluation and management. Please return to the Emergency Department if your symptoms worsen or you develop any additional concerning symptoms.

## 2020-01-19 NOTE — ED TRIAGE NOTES
"Arrived via personal transportation. Pt reports generalized body rash that started this morning. Pt states 'I'm only allergic to shrimp"  "

## 2020-01-19 NOTE — ED PROVIDER NOTES
Encounter Date: 1/19/2020       History     Chief Complaint   Patient presents with    Rash     Pt here with reports of itchy rash all over body since this morning.     HPI  Review of patient's allergies indicates:   Allergen Reactions    Iodinated contrast media Shortness Of Breath and Itching     History reviewed. No pertinent past medical history.  No past surgical history on file.  History reviewed. No pertinent family history.  Social History     Tobacco Use    Smoking status: Never Smoker    Smokeless tobacco: Never Used   Substance Use Topics    Alcohol use: Yes     Comment: occasionally    Drug use: Yes     Types: Marijuana     Review of Systems    Physical Exam     Initial Vitals [01/19/20 1332]   BP Pulse Resp Temp SpO2   121/74 97 18 98.7 °F (37.1 °C) 98 %      MAP       --         Physical Exam    ED Course   Procedures  Labs Reviewed   POCT URINE PREGNANCY          Imaging Results    None          Medical Decision Making:   Clinical Tests:   Lab Tests: Ordered and Reviewed  ED Management:  Hemodynamically stable. Non-toxic and in no acute distress. Overall well appearing, pleasant, conversational. Rash consistent with contact or allergic dermatitis. Will treat with steroids and antihistamines. F/u with PCP and derm. Pt verbalizes understanding and is agreeable with plan. Return instructions given.                                  Clinical Impression:       ICD-10-CM ICD-9-CM   1. Dermatitis L30.9 692.9   2. Rash R21 782.1         Disposition:   Disposition: Discharged  Condition: Stable

## 2020-01-19 NOTE — ED PROVIDER NOTES
Encounter Date: 1/19/2020    SCRIBE #1 NOTE: I, Ke Son, am scribing for, and in the presence of,  Filemon Morales PA-C. I have scribed the following portions of the note - Other sections scribed: HPI, ROS, and PE.       History     Chief Complaint   Patient presents with    Rash     Pt here with reports of itchy rash all over body since this morning.     CC: Rash     HPI:  This is a 34 y.o. female with no PMHx. She presents to the Emergency Department with a cc of generalized rash. The patient noticed the rash when she woke this morning, she states that the rash was not present before she went to sleep. She reports associated body stiffness, itching, and pain secondary to rash. She denies any SOB, fever, chills, myalgias, rhinorrhea, sore throat, cough, abdominal pain, nausea, emesis, or diarrhea. Benadryl was taken, with no relief. There were no worsening factors reported. Patient reports no prior history of similar symptoms. She denies us of any new medication, soap, detergent, or food. Patient states that her menstrual cycles are irregular secondary to use of Mirena.         The history is provided by the patient.     Review of patient's allergies indicates:   Allergen Reactions    Iodinated contrast media Shortness Of Breath and Itching     History reviewed. No pertinent past medical history.  No past surgical history on file.  History reviewed. No pertinent family history.  Social History     Tobacco Use    Smoking status: Never Smoker    Smokeless tobacco: Never Used   Substance Use Topics    Alcohol use: Yes     Comment: occasionally    Drug use: Yes     Types: Marijuana     Review of Systems   Constitutional: Negative for chills and fever.   HENT: Negative for rhinorrhea and sore throat.    Eyes: Negative for visual disturbance.   Respiratory: Negative for cough and shortness of breath.    Cardiovascular: Negative for chest pain.   Gastrointestinal: Negative for abdominal pain, diarrhea, nausea  and vomiting.   Genitourinary: Negative for dysuria and hematuria.   Musculoskeletal: Negative for myalgias.        (+) Body stiffness   Skin: Positive for rash.        (+) Itching   (+) Pain associated to rash   Neurological: Negative for headaches.   Psychiatric/Behavioral: Negative for confusion.       Physical Exam     Initial Vitals [01/19/20 1332]   BP Pulse Resp Temp SpO2   121/74 97 18 98.7 °F (37.1 °C) 98 %      MAP       --         Physical Exam    Nursing note and vitals reviewed.  Constitutional: She appears well-developed and well-nourished. She is not diaphoretic. No distress.   HENT:   Head: Normocephalic and atraumatic.   Mouth/Throat: Oropharynx is clear and moist.   Eyes: EOM are normal. Pupils are equal, round, and reactive to light.   Neck: Normal range of motion. Neck supple.   Cardiovascular: Normal rate and regular rhythm.   Pulmonary/Chest: Breath sounds normal. No respiratory distress.   Abdominal: Soft. Bowel sounds are normal. There is no tenderness.   Musculoskeletal: Normal range of motion.   Neurological: She is alert and oriented to person, place, and time. She has normal strength.   Skin: Skin is warm and dry. Capillary refill takes less than 2 seconds. Rash noted.   Diffuse maculopapular rash to abdomen, chest, back, bilateral upper extremities. No erythema, warmth, tenderness.   Psychiatric: She has a normal mood and affect.         ED Course   Procedures  Labs Reviewed   POCT URINE PREGNANCY          Imaging Results    None          Medical Decision Making:   Clinical Tests:   Lab Tests: Ordered and Reviewed  ED Management:  Hemodynamically stable. Non-toxic and in no acute distress. Overall well appearing, pleasant, conversational. Rash consistent with contact or allergic dermatitis. Will treat with steroids and antihistamines. F/u with PCP and derm. Pt verbalizes understanding and is agreeable with plan. Return instructions given.             Scribe Attestation:   Scribe #1: I  performed the above scribed service and the documentation accurately describes the services I performed. I attest to the accuracy of the note.                          Clinical Impression:     1. Dermatitis    2. Rash            Disposition:   Disposition: Discharged  Condition: Stable        Scribe attestation: I, Filemon Morales, personally performed the services described in this documentation. All medical record entries made by the scribe were at my direction and in my presence.  I have reviewed the chart and agree that the record reflects my personal performance and is accurate and complete.               Filemon Morales PA-C  01/19/20 1836